# Patient Record
Sex: FEMALE | HISPANIC OR LATINO | Employment: FULL TIME | URBAN - METROPOLITAN AREA
[De-identification: names, ages, dates, MRNs, and addresses within clinical notes are randomized per-mention and may not be internally consistent; named-entity substitution may affect disease eponyms.]

---

## 2022-03-31 ENCOUNTER — APPOINTMENT (OUTPATIENT)
Dept: RADIOLOGY | Facility: MEDICAL CENTER | Age: 22
End: 2022-03-31
Payer: COMMERCIAL

## 2022-03-31 ENCOUNTER — HOSPITAL ENCOUNTER (EMERGENCY)
Facility: MEDICAL CENTER | Age: 22
End: 2022-03-31
Attending: EMERGENCY MEDICINE
Payer: COMMERCIAL

## 2022-03-31 VITALS
DIASTOLIC BLOOD PRESSURE: 74 MMHG | OXYGEN SATURATION: 100 % | WEIGHT: 125 LBS | HEART RATE: 90 BPM | HEIGHT: 63 IN | TEMPERATURE: 98 F | SYSTOLIC BLOOD PRESSURE: 122 MMHG | RESPIRATION RATE: 16 BRPM | BODY MASS INDEX: 22.15 KG/M2

## 2022-03-31 DIAGNOSIS — M54.50 ACUTE MIDLINE LOW BACK PAIN WITHOUT SCIATICA: ICD-10-CM

## 2022-03-31 DIAGNOSIS — M54.6 ACUTE MIDLINE THORACIC BACK PAIN: ICD-10-CM
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
## 2022-03-31 PROCEDURE — 72100 X-RAY EXAM L-S SPINE 2/3 VWS: CPT

## 2022-03-31 PROCEDURE — 72070 X-RAY EXAM THORAC SPINE 2VWS: CPT

## 2022-03-31 PROCEDURE — 99284 EMERGENCY DEPT VISIT MOD MDM: CPT

## 2022-03-31 PROCEDURE — 71045 X-RAY EXAM CHEST 1 VIEW: CPT

## 2022-03-31 PROCEDURE — 700102 HCHG RX REV CODE 250 W/ 637 OVERRIDE(OP)

## 2022-03-31 PROCEDURE — A9270 NON-COVERED ITEM OR SERVICE: HCPCS

## 2022-03-31 PROCEDURE — 307740 HCHG GREEN TRAUMA TEAM SERVICES

## 2022-03-31 RX ORDER — CYCLOBENZAPRINE HCL 5 MG
5-10 TABLET ORAL 3 TIMES DAILY PRN
Qty: 30 TABLET | Refills: 0 | Status: SHIPPED | OUTPATIENT
Start: 2022-03-31

## 2022-03-31 RX ORDER — CYCLOBENZAPRINE HCL 5 MG
5-10 TABLET ORAL 3 TIMES DAILY PRN
Qty: 30 TABLET | Refills: 0 | Status: SHIPPED | OUTPATIENT
Start: 2022-03-31 | End: 2022-03-31 | Stop reason: SDUPTHER

## 2022-03-31 RX ORDER — IBUPROFEN 600 MG/1
600 TABLET ORAL ONCE
Status: COMPLETED | OUTPATIENT
Start: 2022-03-31 | End: 2022-03-31

## 2022-03-31 RX ORDER — ACETAMINOPHEN 325 MG/1
650 TABLET ORAL ONCE
Status: COMPLETED | OUTPATIENT
Start: 2022-03-31 | End: 2022-03-31

## 2022-03-31 RX ORDER — CYCLOBENZAPRINE HCL 10 MG
10 TABLET ORAL ONCE
Status: COMPLETED | OUTPATIENT
Start: 2022-03-31 | End: 2022-03-31

## 2022-03-31 RX ADMIN — IBUPROFEN 600 MG: 600 TABLET ORAL at 20:07

## 2022-03-31 RX ADMIN — ACETAMINOPHEN 650 MG: 325 TABLET, FILM COATED ORAL at 20:06

## 2022-03-31 RX ADMIN — CYCLOBENZAPRINE 10 MG: 10 TABLET, FILM COATED ORAL at 20:07
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
## 2022-04-01 NOTE — ED NOTES
Discharge instructions completed with pt and boyfriend at bedside, prescription switched to print out per pt request. One prescription covered and follow-up care discussed. Work note provided and pt ambulatory to lobby without assistance
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
## 2022-06-21 ENCOUNTER — HOSPITAL ENCOUNTER (EMERGENCY)
Facility: MEDICAL CENTER | Age: 22
End: 2022-06-21
Attending: EMERGENCY MEDICINE
Payer: COMMERCIAL

## 2022-06-21 VITALS
BODY MASS INDEX: 21.88 KG/M2 | DIASTOLIC BLOOD PRESSURE: 59 MMHG | OXYGEN SATURATION: 100 % | RESPIRATION RATE: 18 BRPM | HEART RATE: 73 BPM | TEMPERATURE: 97 F | WEIGHT: 123.46 LBS | HEIGHT: 63 IN | SYSTOLIC BLOOD PRESSURE: 106 MMHG

## 2022-06-21 DIAGNOSIS — R10.12 LUQ PAIN: ICD-10-CM

## 2022-06-21 DIAGNOSIS — R53.83 OTHER FATIGUE: ICD-10-CM

## 2022-06-21 LAB
ALBUMIN SERPL BCP-MCNC: 4.9 G/DL (ref 3.2–4.9)
ALBUMIN/GLOB SERPL: 1.9 G/DL
ALP SERPL-CCNC: 76 U/L (ref 30–99)
ALT SERPL-CCNC: 11 U/L (ref 2–50)
ANION GAP SERPL CALC-SCNC: 13 MMOL/L (ref 7–16)
APPEARANCE UR: CLEAR
AST SERPL-CCNC: 19 U/L (ref 12–45)
BASOPHILS # BLD AUTO: 0.5 % (ref 0–1.8)
BASOPHILS # BLD: 0.03 K/UL (ref 0–0.12)
BILIRUB SERPL-MCNC: 0.7 MG/DL (ref 0.1–1.5)
BILIRUB UR QL STRIP.AUTO: NEGATIVE
BUN SERPL-MCNC: 10 MG/DL (ref 8–22)
CALCIUM SERPL-MCNC: 9.1 MG/DL (ref 8.5–10.5)
CHLORIDE SERPL-SCNC: 103 MMOL/L (ref 96–112)
CO2 SERPL-SCNC: 22 MMOL/L (ref 20–33)
COLOR UR: YELLOW
CREAT SERPL-MCNC: 0.64 MG/DL (ref 0.5–1.4)
EOSINOPHIL # BLD AUTO: 0.1 K/UL (ref 0–0.51)
EOSINOPHIL NFR BLD: 1.6 % (ref 0–6.9)
ERYTHROCYTE [DISTWIDTH] IN BLOOD BY AUTOMATED COUNT: 40.4 FL (ref 35.9–50)
GFR SERPLBLD CREATININE-BSD FMLA CKD-EPI: 129 ML/MIN/1.73 M 2
GLOBULIN SER CALC-MCNC: 2.6 G/DL (ref 1.9–3.5)
GLUCOSE SERPL-MCNC: 86 MG/DL (ref 65–99)
GLUCOSE UR STRIP.AUTO-MCNC: NEGATIVE MG/DL
HCG SERPL QL: NEGATIVE
HCT VFR BLD AUTO: 40.5 % (ref 37–47)
HGB BLD-MCNC: 14 G/DL (ref 12–16)
IMM GRANULOCYTES # BLD AUTO: 0.01 K/UL (ref 0–0.11)
IMM GRANULOCYTES NFR BLD AUTO: 0.2 % (ref 0–0.9)
KETONES UR STRIP.AUTO-MCNC: NEGATIVE MG/DL
LEUKOCYTE ESTERASE UR QL STRIP.AUTO: NEGATIVE
LIPASE SERPL-CCNC: 23 U/L (ref 11–82)
LYMPHOCYTES # BLD AUTO: 2.75 K/UL (ref 1–4.8)
LYMPHOCYTES NFR BLD: 43.2 % (ref 22–41)
MCH RBC QN AUTO: 31.9 PG (ref 27–33)
MCHC RBC AUTO-ENTMCNC: 34.6 G/DL (ref 33.6–35)
MCV RBC AUTO: 92.3 FL (ref 81.4–97.8)
MICRO URNS: NORMAL
MONOCYTES # BLD AUTO: 0.45 K/UL (ref 0–0.85)
MONOCYTES NFR BLD AUTO: 7.1 % (ref 0–13.4)
NEUTROPHILS # BLD AUTO: 3.02 K/UL (ref 2–7.15)
NEUTROPHILS NFR BLD: 47.4 % (ref 44–72)
NITRITE UR QL STRIP.AUTO: NEGATIVE
NRBC # BLD AUTO: 0 K/UL
NRBC BLD-RTO: 0 /100 WBC
PH UR STRIP.AUTO: 5.5 [PH] (ref 5–8)
PLATELET # BLD AUTO: 235 K/UL (ref 164–446)
PMV BLD AUTO: 9.3 FL (ref 9–12.9)
POTASSIUM SERPL-SCNC: 4 MMOL/L (ref 3.6–5.5)
PROT SERPL-MCNC: 7.5 G/DL (ref 6–8.2)
PROT UR QL STRIP: NEGATIVE MG/DL
RBC # BLD AUTO: 4.39 M/UL (ref 4.2–5.4)
RBC UR QL AUTO: NEGATIVE
SODIUM SERPL-SCNC: 138 MMOL/L (ref 135–145)
SP GR UR STRIP.AUTO: 1.01
UROBILINOGEN UR STRIP.AUTO-MCNC: 1 MG/DL
WBC # BLD AUTO: 6.4 K/UL (ref 4.8–10.8)

## 2022-06-21 PROCEDURE — 84703 CHORIONIC GONADOTROPIN ASSAY: CPT

## 2022-06-21 PROCEDURE — 36415 COLL VENOUS BLD VENIPUNCTURE: CPT

## 2022-06-21 PROCEDURE — 700102 HCHG RX REV CODE 250 W/ 637 OVERRIDE(OP): Performed by: EMERGENCY MEDICINE

## 2022-06-21 PROCEDURE — A9270 NON-COVERED ITEM OR SERVICE: HCPCS | Performed by: EMERGENCY MEDICINE

## 2022-06-21 PROCEDURE — 81003 URINALYSIS AUTO W/O SCOPE: CPT

## 2022-06-21 PROCEDURE — 80053 COMPREHEN METABOLIC PANEL: CPT

## 2022-06-21 PROCEDURE — 83690 ASSAY OF LIPASE: CPT

## 2022-06-21 PROCEDURE — 85025 COMPLETE CBC W/AUTO DIFF WBC: CPT

## 2022-06-21 PROCEDURE — 99283 EMERGENCY DEPT VISIT LOW MDM: CPT

## 2022-06-21 RX ORDER — OMEPRAZOLE 20 MG/1
20 CAPSULE, DELAYED RELEASE ORAL DAILY
Qty: 30 CAPSULE | Refills: 0 | Status: SHIPPED | OUTPATIENT
Start: 2022-06-21

## 2022-06-21 RX ADMIN — LIDOCAINE HYDROCHLORIDE 30 ML: 20 SOLUTION OROPHARYNGEAL at 19:52

## 2022-06-22 NOTE — ED TRIAGE NOTES
"Pt ambulatory to triage c/o feeling lightheaded all weekend and states hx of anemia so thought \"that's probably what it is\" pt states takes iron supplements but today began to have luq pain radiating to left flank. Denies fever denies painful urination. Last normal BM today. Nad. vss  "

## 2022-06-22 NOTE — ED NOTES
Patient to room from lobby with steady gait. Changed into gown, connected to monitor. Agree with triage note. Charted for ERP. Call light within reach.

## 2022-06-22 NOTE — ED PROVIDER NOTES
"ED Provider Note    Scribed for Graciela Cordoba M.D. by Meenakshi Harris. 6/21/2022  6:47 PM    Means of arrival: Walk in  History obtained from: patient  History limited by: none      CHIEF COMPLAINT  Chief Complaint   Patient presents with   • Flank Pain   • Lightheadedness       HPI  Libby Ortiz is a 21 y.o. otherwise healthy female who presents to the Emergency Department for fatigue onset 4 days ago. She has associated left upper quadrant abdominal pain and mild headache. Also some associated pain the left flank.  Denies dysuria, cough, congestion, nausea, vomiting, or fevers. Denies history of similar symptoms. She has a history of anemia and was concerned for this today. Endorses saying hydrated. Denies chanance of pregnancy.    REVIEW OF SYSTEMS  Pertinent positive include fatigue, left flank pain, and headache. Pertinent negative include dysuria, cough, congestion, nausea, vomiting, or fevers. All other systems reviewed and are negative.      PAST MEDICAL HISTORY   History of anemia    SOCIAL HISTORY  Social History     Tobacco Use   • Smoking status: Never Smoker   • Smokeless tobacco: Current User   Vaping Use   • Vaping Use: Every day   Substance and Sexual Activity   • Alcohol use: Not Currently   • Drug use: Yes     Types: Inhaled     Comment: marijuana       SURGICAL HISTORY  patient denies any surgical history    CURRENT MEDICATIONS  Current Outpatient Medications   Medication Instructions   • cyclobenzaprine (FLEXERIL) 5-10 mg, Oral, 3 TIMES DAILY PRN       ALLERGIES  No Known Allergies    PHYSICAL EXAM   VITAL SIGNS: /59   Pulse 66   Temp 36.1 °C (96.9 °F) (Temporal)   Resp 15   Ht 1.6 m (5' 3\")   Wt 56 kg (123 lb 7.3 oz)   SpO2 100%   BMI 21.87 kg/m²    Constitutional: Nontoxic appearing, alert in no apparent distress.  HENT: Normocephalic, Atraumatic. Bilateral external ears normal. Nose normal.  Moist mucous membranes.  Oropharynx clear.  Eyes: Pupils are equal and " reactive. Conjunctiva normal.   Neck: Supple, full range of motion  Heart: Regular rate and rhythm.  No murmurs.    Lungs: No respiratory distress, normal work of breathing. Lungs clear to auscultation bilaterally.  Abdomen Soft, no distention. Mild left upper quadrant tenderness to palpation.  No CVA TTP.  Musculoskeletal: Atraumatic. No obvious deformities noted.  No lower extremity edema.  Skin: Warm, Dry.  No erythema, No rash.   Neurologic: Alert and oriented x3. Moving all extremities spontaneously without focal deficits.  Psychiatric: Affect normal, Mood normal, Appears appropriate and not intoxicated.      DIAGNOSTIC STUDIES  LABS  Personally reviewed by me  Labs Reviewed   CBC WITH DIFFERENTIAL - Abnormal; Notable for the following components:       Result Value    Lymphocytes 43.20 (*)     All other components within normal limits   COMP METABOLIC PANEL   LIPASE   HCG QUAL SERUM   URINALYSIS,CULTURE IF INDICATED    Narrative:     Indication for culture:->Patient WITHOUT an indwelling Méndez  catheter in place with new onset of Dysuria, Frequency,  Urgency, and/or Suprapubic pain   ESTIMATED GFR        ED COURSE  Vitals:    06/21/22 1741 06/21/22 1836 06/21/22 1838 06/21/22 1900   BP:  106/59     Pulse:   66 73   Resp:   15 18   Temp:    36.1 °C (97 °F)   TempSrc:    Temporal   SpO2:   100% 100%   Weight: 56 kg (123 lb 7.3 oz)      Height:             Medications administered:  Medications   hyoscyamine-lidocaine-Maalox (GI Cocktail) oral susp cup 30 mL (30 mL Oral Given 6/21/22 1952)       6:47 PM Patient seen and examined at bedside. The patient presents with fatigue and left flank pain. Labs ordered in triage including CBC w/ diff, CMP, lipase, HCG qual, and UA. I informed her this could be related to a viral illness or possible related to her stomach.       MEDICAL DECISION MAKING  Young healthy patient presents with 4 day history of fatigue and vague upper abdominal pain.  She is well appearing with  normal vitals.  Abdominal exam is benign without concern for appendicitis, cholecystitis, pyelonephritis.   Labs are normal without leukocytosis, anemia, transaminitis, elevated lipase concerning for pancreatitis.  Not pregnant.  UA negative for infection or blood to suggest kidney stone.  Possible viral illness or gastritis.  Will discharge home with PPI, diet recommendations and outpatient followup for further workup of fatigue.  Patient understands plan of care and strict return precautions for changing or worsening symptoms.      The patient will return for new or worsening symptoms and is stable at the time of discharge.    DISPOSITION:  Patient will be discharged home in stable condition.    FOLLOW UP:  Sara Chow M.D.  [address] [facility] [place] [address]  [phone]    Schedule an appointment as soon as possible for a visit       [facility], Emergency Dept  [address] [facility] [id number] [phone]    If symptoms worsen      OUTPATIENT MEDICATIONS:  Discharge Medication List as of 6/21/2022  7:53 PM      START taking these medications    Details   omeprazole (PRILOSEC) 20 MG delayed-release capsule Take 1 Capsule by mouth every day., Disp-30 Capsule, R-0, Normal             IMPRESSION  (R10.12) LUQ pain  (R53.83) Other fatigue    Results, diagnoses, and treatment options were discussed with the patient and/or family. Patient verbalized understanding of plan of care.           Meenakshi FOX ([place]), am scribing for, and in the presence of, Graciela Cordoba M.D..    Electronically signed by: Meenakshi Harris ([place]), 6/21/2022    Graciela FOX M.D. personally performed the services described in this documentation, as scribed by Meenakshi Harris in my presence, and it is both accurate and complete.    The note accurately reflects work and decisions made by me.  Graciela Cordoba M.D.  6/22/2022  10:45 AM

## 2022-06-22 NOTE — DISCHARGE INSTRUCTIONS
You were seen in the Emergency Department for abdominal pain and fatigue.    Labs, urine test were completed without significant acute abnormalities.    Please use 1,000mg of tylenol  every 6 hours as needed for pain.  Take medications for possible reflux or gastritis, avoid alcohol, nsaids such as ibuprofen, spicy foods.    Please follow up with your primary care physician.    Return to the Emergency Department with worsening pain, fevers, vomiting, or other concerns.

## 2022-06-22 NOTE — ED NOTES
PT walked back to PUR 72 no assistance needed. She was changed into a gown and put on the monitor.

## 2022-06-22 NOTE — ED NOTES
Discharge teaching and paperwork provided regarding LUQ pain & fatigue and all questions/concerns answered. VSS,  assessment stable. Given information regarding home care and reasons to follow up with ED or primary MD. Patient provided with prilosec Rx.

## 2023-04-17 ENCOUNTER — OFFICE VISIT (OUTPATIENT)
Dept: URGENT CARE | Facility: PHYSICIAN GROUP | Age: 23
End: 2023-04-17
Payer: COMMERCIAL

## 2023-04-17 VITALS
RESPIRATION RATE: 16 BRPM | SYSTOLIC BLOOD PRESSURE: 100 MMHG | BODY MASS INDEX: 21.48 KG/M2 | WEIGHT: 121.25 LBS | TEMPERATURE: 98.5 F | OXYGEN SATURATION: 100 % | HEIGHT: 63 IN | DIASTOLIC BLOOD PRESSURE: 60 MMHG | HEART RATE: 84 BPM

## 2023-04-17 DIAGNOSIS — R10.9 ABDOMINAL PAIN, UNSPECIFIED ABDOMINAL LOCATION: ICD-10-CM

## 2023-04-17 LAB
APPEARANCE UR: CLEAR
BILIRUB UR STRIP-MCNC: NEGATIVE MG/DL
COLOR UR AUTO: YELLOW
GLUCOSE UR STRIP.AUTO-MCNC: NEGATIVE MG/DL
KETONES UR STRIP.AUTO-MCNC: NEGATIVE MG/DL
LEUKOCYTE ESTERASE UR QL STRIP.AUTO: NEGATIVE
NITRITE UR QL STRIP.AUTO: NEGATIVE
PH UR STRIP.AUTO: 6.5 [PH] (ref 5–8)
POCT INT CON NEG: NEGATIVE
POCT INT CON POS: POSITIVE
POCT URINE PREGNANCY TEST: NEGATIVE
PROT UR QL STRIP: NORMAL MG/DL
RBC UR QL AUTO: NEGATIVE
SP GR UR STRIP.AUTO: 1.02
UROBILINOGEN UR STRIP-MCNC: 1 MG/DL

## 2023-04-17 PROCEDURE — 81002 URINALYSIS NONAUTO W/O SCOPE: CPT | Performed by: PHYSICIAN ASSISTANT

## 2023-04-17 PROCEDURE — 99204 OFFICE O/P NEW MOD 45 MIN: CPT | Performed by: PHYSICIAN ASSISTANT

## 2023-04-17 PROCEDURE — 81025 URINE PREGNANCY TEST: CPT | Performed by: PHYSICIAN ASSISTANT

## 2023-04-17 ASSESSMENT — ENCOUNTER SYMPTOMS
BACK PAIN: 1
FEVER: 0
ABDOMINAL PAIN: 1
VOMITING: 0
BRUISES/BLEEDS EASILY: 0
FLANK PAIN: 0
BLOOD IN STOOL: 0
DIARRHEA: 0
DIZZINESS: 0
CONSTIPATION: 0
HEADACHES: 0
NAUSEA: 1
SHORTNESS OF BREATH: 0

## 2023-04-17 ASSESSMENT — FIBROSIS 4 INDEX: FIB4 SCORE: 0.51

## 2023-04-18 NOTE — PROGRESS NOTES
"Subjective     Libby Ortiz is a 22 y.o. female who presents with Abdominal Pain (Upper gastric pain started this morning radiates to back .)    HPI:  Libby Ortiz is a 22 y.o. female who presents today for evaluation of abdominal pain.  Patient reports that this morning, after she woke up, she was doing her daily stretching activities.  At one point she lifted her arms high above her head and she says she felt as though something \"snapped\" in her upper abdomen.  She said that she had immediate severe pain in her upper abdomen.  She has been taking acetaminophen all day and notes that it mildly dulls the pain but does not take it away fully.  She that she did have a bowel movement earlier today which was normal.  She has felt nauseous when the pain builds up has not had any vomiting.  No fever.  Says that she occasionally drinks alcohol.  Did not drink excess alcohol this past weekend because a friend was in town but she normally does not drink at all.      Review of Systems   Constitutional:  Negative for fever.   Respiratory:  Negative for shortness of breath.    Cardiovascular:  Negative for chest pain.   Gastrointestinal:  Positive for abdominal pain and nausea. Negative for blood in stool, constipation, diarrhea, melena and vomiting.   Genitourinary:  Negative for dysuria, flank pain, frequency, hematuria and urgency.   Musculoskeletal:  Positive for back pain.   Neurological:  Negative for dizziness and headaches.   Endo/Heme/Allergies:  Does not bruise/bleed easily.           PMH:  has no past medical history on file.  MEDS:   Current Outpatient Medications:     omeprazole (PRILOSEC) 20 MG delayed-release capsule, Take 1 Capsule by mouth every day. (Patient not taking: Reported on 4/17/2023), Disp: 30 Capsule, Rfl: 0    cyclobenzaprine (FLEXERIL) 5 mg tablet, Take 1-2 Tablets by mouth 3 times a day as needed. (Patient not taking: Reported on 4/17/2023), Disp: 30 Tablet, Rfl: " "0  ALLERGIES: No Known Allergies  SURGHX: History reviewed. No pertinent surgical history.  SOCHX:  reports that she has never smoked. She uses smokeless tobacco. She reports that she does not currently use alcohol. She reports current drug use. Drug: Inhaled.  FH: Family history was reviewed, no pertinent findings to report        Objective     /60 (BP Location: Right arm, Patient Position: Sitting, BP Cuff Size: Adult)   Pulse 84   Temp 36.9 °C (98.5 °F) (Temporal)   Resp 16   Ht 1.6 m (5' 3\")   Wt 55 kg (121 lb 4.1 oz)   SpO2 100%   BMI 21.48 kg/m²      Physical Exam  Constitutional:       Appearance: Normal appearance. She is well-developed.   HENT:      Head: Normocephalic and atraumatic.      Right Ear: External ear normal.      Left Ear: External ear normal.   Eyes:      Conjunctiva/sclera: Conjunctivae normal.      Pupils: Pupils are equal, round, and reactive to light.   Cardiovascular:      Rate and Rhythm: Normal rate and regular rhythm.      Heart sounds: No murmur heard.  Pulmonary:      Effort: Pulmonary effort is normal.      Breath sounds: Normal breath sounds.   Abdominal:      General: Abdomen is flat. Bowel sounds are normal.      Palpations: Abdomen is soft.      Tenderness: There is abdominal tenderness in the epigastric area, periumbilical area, left upper quadrant and left lower quadrant.      Comments: Diffuse upper and left-sided abdominal tenderness with mild guarding noted with palpation of the left abdomen.   Skin:     General: Skin is warm and dry.      Capillary Refill: Capillary refill takes less than 2 seconds.   Neurological:      Mental Status: She is alert and oriented to person, place, and time.   Psychiatric:         Behavior: Behavior normal.         Judgment: Judgment normal.         POCT Pregnancy - Negative      POCT Urinalysis  Lab Results   Component Value Date/Time    POCCOLOR Yellow 04/17/2023 06:39 PM    POCAPPEAR Clear 04/17/2023 06:39 PM    POCLEUKEST " Negative 04/17/2023 06:39 PM    POCNITRITE Negative 04/17/2023 06:39 PM    POCUROBILIGE 1.0 04/17/2023 06:39 PM    POCPROTEIN Trace 04/17/2023 06:39 PM    POCURPH 6.5 04/17/2023 06:39 PM    POCBLOOD Negative 04/17/2023 06:39 PM    POCSPGRV 1.025 04/17/2023 06:39 PM    POCKETONES Negative 04/17/2023 06:39 PM    POCBILIRUBIN Negative 04/17/2023 06:39 PM    POCGLUCUA Negative 04/17/2023 06:39 PM        Assessment & Plan     1. Abdominal pain, unspecified abdominal location  - POCT Urinalysis  - POCT Pregnancy    Patient complaining of severe abdominal pain starting this morning.  Says that it is not really being touched by OTC acetaminophen.  On exam she is exquisitely tender in the left side of abdomen and epigastric region with some mild guarding noted.  I believe patient needs further work-up that we can provide from the urgent care setting tonight.  She is therefore referred to the emergency department for higher level of care.  She is going via private vehicle.  She thinks she is going to go to [facility].

## 2024-12-04 ENCOUNTER — APPOINTMENT (OUTPATIENT)
Dept: RADIOLOGY | Facility: MEDICAL CENTER | Age: 24
End: 2024-12-04
Attending: EMERGENCY MEDICINE
Payer: COMMERCIAL

## 2024-12-04 ENCOUNTER — HOSPITAL ENCOUNTER (EMERGENCY)
Facility: MEDICAL CENTER | Age: 24
End: 2024-12-04
Attending: EMERGENCY MEDICINE
Payer: COMMERCIAL

## 2024-12-04 VITALS
WEIGHT: 115.96 LBS | SYSTOLIC BLOOD PRESSURE: 118 MMHG | BODY MASS INDEX: 20.55 KG/M2 | HEIGHT: 63 IN | DIASTOLIC BLOOD PRESSURE: 71 MMHG | TEMPERATURE: 98.2 F | OXYGEN SATURATION: 98 % | HEART RATE: 75 BPM | RESPIRATION RATE: 18 BRPM

## 2024-12-04 DIAGNOSIS — R10.9 ABDOMINAL PAIN, UNSPECIFIED ABDOMINAL LOCATION: ICD-10-CM

## 2024-12-04 LAB
ALBUMIN SERPL BCP-MCNC: 4.5 G/DL (ref 3.2–4.9)
ALBUMIN/GLOB SERPL: 1.6 G/DL
ALP SERPL-CCNC: 66 U/L (ref 30–99)
ALT SERPL-CCNC: 9 U/L (ref 2–50)
ANION GAP SERPL CALC-SCNC: 12 MMOL/L (ref 7–16)
APPEARANCE UR: CLEAR
AST SERPL-CCNC: 20 U/L (ref 12–45)
BACTERIA #/AREA URNS HPF: ABNORMAL /HPF
BASOPHILS # BLD AUTO: 0.4 % (ref 0–1.8)
BASOPHILS # BLD: 0.03 K/UL (ref 0–0.12)
BILIRUB SERPL-MCNC: 0.3 MG/DL (ref 0.1–1.5)
BILIRUB UR QL STRIP.AUTO: NEGATIVE
BUN SERPL-MCNC: 15 MG/DL (ref 8–22)
CALCIUM ALBUM COR SERPL-MCNC: 9.1 MG/DL (ref 8.5–10.5)
CALCIUM SERPL-MCNC: 9.5 MG/DL (ref 8.5–10.5)
CASTS URNS QL MICRO: ABNORMAL /LPF (ref 0–2)
CHLORIDE SERPL-SCNC: 106 MMOL/L (ref 96–112)
CO2 SERPL-SCNC: 23 MMOL/L (ref 20–33)
COLOR UR: YELLOW
CREAT SERPL-MCNC: 0.63 MG/DL (ref 0.5–1.4)
EOSINOPHIL # BLD AUTO: 0.1 K/UL (ref 0–0.51)
EOSINOPHIL NFR BLD: 1.5 % (ref 0–6.9)
EPITHELIAL CELLS 1715: ABNORMAL /HPF (ref 0–5)
ERYTHROCYTE [DISTWIDTH] IN BLOOD BY AUTOMATED COUNT: 41.1 FL (ref 35.9–50)
GFR SERPLBLD CREATININE-BSD FMLA CKD-EPI: 127 ML/MIN/1.73 M 2
GLOBULIN SER CALC-MCNC: 2.9 G/DL (ref 1.9–3.5)
GLUCOSE SERPL-MCNC: 100 MG/DL (ref 65–99)
GLUCOSE UR STRIP.AUTO-MCNC: NEGATIVE MG/DL
HCG SERPL QL: NEGATIVE
HCT VFR BLD AUTO: 40.9 % (ref 37–47)
HGB BLD-MCNC: 14 G/DL (ref 12–16)
IMM GRANULOCYTES # BLD AUTO: 0.02 K/UL (ref 0–0.11)
IMM GRANULOCYTES NFR BLD AUTO: 0.3 % (ref 0–0.9)
KETONES UR STRIP.AUTO-MCNC: ABNORMAL MG/DL
LEUKOCYTE ESTERASE UR QL STRIP.AUTO: NEGATIVE
LIPASE SERPL-CCNC: 27 U/L (ref 11–82)
LYMPHOCYTES # BLD AUTO: 2.3 K/UL (ref 1–4.8)
LYMPHOCYTES NFR BLD: 33.4 % (ref 22–41)
MCH RBC QN AUTO: 31.8 PG (ref 27–33)
MCHC RBC AUTO-ENTMCNC: 34.2 G/DL (ref 32.2–35.5)
MCV RBC AUTO: 93 FL (ref 81.4–97.8)
MICRO URNS: ABNORMAL
MONOCYTES # BLD AUTO: 0.38 K/UL (ref 0–0.85)
MONOCYTES NFR BLD AUTO: 5.5 % (ref 0–13.4)
NEUTROPHILS # BLD AUTO: 4.05 K/UL (ref 1.82–7.42)
NEUTROPHILS NFR BLD: 58.9 % (ref 44–72)
NITRITE UR QL STRIP.AUTO: NEGATIVE
NRBC # BLD AUTO: 0 K/UL
NRBC BLD-RTO: 0 /100 WBC (ref 0–0.2)
PH UR STRIP.AUTO: 5.5 [PH] (ref 5–8)
PLATELET # BLD AUTO: 280 K/UL (ref 164–446)
PMV BLD AUTO: 9.4 FL (ref 9–12.9)
POTASSIUM SERPL-SCNC: 3.8 MMOL/L (ref 3.6–5.5)
PROT SERPL-MCNC: 7.4 G/DL (ref 6–8.2)
PROT UR QL STRIP: 30 MG/DL
RBC # BLD AUTO: 4.4 M/UL (ref 4.2–5.4)
RBC # URNS HPF: ABNORMAL /HPF (ref 0–2)
RBC UR QL AUTO: NEGATIVE
SODIUM SERPL-SCNC: 141 MMOL/L (ref 135–145)
SP GR UR STRIP.AUTO: 1.03
UROBILINOGEN UR STRIP.AUTO-MCNC: 1 EU/DL
WBC # BLD AUTO: 6.9 K/UL (ref 4.8–10.8)
WBC #/AREA URNS HPF: ABNORMAL /HPF

## 2024-12-04 PROCEDURE — 96374 THER/PROPH/DIAG INJ IV PUSH: CPT

## 2024-12-04 PROCEDURE — 80053 COMPREHEN METABOLIC PANEL: CPT

## 2024-12-04 PROCEDURE — 36415 COLL VENOUS BLD VENIPUNCTURE: CPT

## 2024-12-04 PROCEDURE — 700111 HCHG RX REV CODE 636 W/ 250 OVERRIDE (IP): Mod: JZ | Performed by: EMERGENCY MEDICINE

## 2024-12-04 PROCEDURE — 83690 ASSAY OF LIPASE: CPT

## 2024-12-04 PROCEDURE — 96375 TX/PRO/DX INJ NEW DRUG ADDON: CPT

## 2024-12-04 PROCEDURE — 700117 HCHG RX CONTRAST REV CODE 255: Performed by: EMERGENCY MEDICINE

## 2024-12-04 PROCEDURE — 85025 COMPLETE CBC W/AUTO DIFF WBC: CPT

## 2024-12-04 PROCEDURE — 74177 CT ABD & PELVIS W/CONTRAST: CPT

## 2024-12-04 PROCEDURE — 84703 CHORIONIC GONADOTROPIN ASSAY: CPT

## 2024-12-04 PROCEDURE — 81001 URINALYSIS AUTO W/SCOPE: CPT

## 2024-12-04 PROCEDURE — 99284 EMERGENCY DEPT VISIT MOD MDM: CPT

## 2024-12-04 RX ORDER — ONDANSETRON 2 MG/ML
4 INJECTION INTRAMUSCULAR; INTRAVENOUS ONCE
Status: COMPLETED | OUTPATIENT
Start: 2024-12-04 | End: 2024-12-04

## 2024-12-04 RX ADMIN — IOHEXOL 100 ML: 350 INJECTION, SOLUTION INTRAVENOUS at 14:15

## 2024-12-04 RX ADMIN — ONDANSETRON 4 MG: 2 INJECTION INTRAMUSCULAR; INTRAVENOUS at 13:40

## 2024-12-04 RX ADMIN — FENTANYL CITRATE 50 MCG: 50 INJECTION, SOLUTION INTRAMUSCULAR; INTRAVENOUS at 13:42

## 2024-12-04 ASSESSMENT — PAIN DESCRIPTION - PAIN TYPE: TYPE: ACUTE PAIN

## 2024-12-04 NOTE — ED PROVIDER NOTES
"ED Provider Note    CHIEF COMPLAINT  Chief Complaint   Patient presents with    Abdominal Pain     Burning umbilical abdominal pain x3 hours that radiates to back. (+) nausea, (-) V/D.            HPI/ROS    OUTSIDE HISTORIAN(S):  Patient's boyfriend is at bedside adding history review of systems.    Libby Ortiz is a 24 y.o. female who presents with abdominal pain.  She [employer] pain proximate 3 hours prior arrival.  Pain is dull in nature, to her bellybutton radiates to her back, no alleviating or exacerbating factors.  She denies dysuria, hematochezia, melena, hematemesis, nausea, vomiting, fever.    PAST MEDICAL HISTORY       SURGICAL HISTORY  patient denies any surgical history    FAMILY HISTORY  History reviewed. No pertinent family history.    SOCIAL HISTORY  Social History     Tobacco Use    Smoking status: Never    Smokeless tobacco: Current   Vaping Use    Vaping status: Every Day   Substance and Sexual Activity    Alcohol use: Not Currently    Drug use: Yes     Types: Inhaled     Comment: marijuana    Sexual activity: Not on file       CURRENT MEDICATIONS  Home Medications       Reviewed by Ivon Victoria R.N. (Registered Nurse) on 12/04/24 at 1229  Med List Status: Partial     Medication Last Dose Status   cyclobenzaprine (FLEXERIL) 5 mg tablet  Active   omeprazole (PRILOSEC) 20 MG delayed-release capsule  Active                  Audit from Redirected Encounters    **Home medications have not yet been reviewed for this encounter**         ALLERGIES  No Known Allergies    PHYSICAL EXAM  VITAL SIGNS: /71   Pulse 75   Temp 36.9 °C (98.5 °F) (Temporal)   Resp 18   Ht 1.6 m (5' 3\")   Wt 52.6 kg (115 lb 15.4 oz)   SpO2 98%   BMI 20.54 kg/m²      Nursing notes and vitals reviewed.  Constitutional: Well developed, Well nourished, No acute distress, Non-toxic appearance.   Abdomen: Bowel sounds normal, Soft, moderate periumbilical tenderness, slight guarding, rebound, negative " Chan's sign   skin: Warm, Dry, No erythema, No rash.   Extremities: No deformity, no edema, good range of motion range of motion upper lower extremes bilaterally      EKG/LABS  Results for orders placed or performed during the hospital encounter of 12/04/24   CBC WITH DIFFERENTIAL    Collection Time: 12/04/24 12:43 PM   Result Value Ref Range    WBC 6.9 4.8 - 10.8 K/uL    RBC 4.40 4.20 - 5.40 M/uL    Hemoglobin 14.0 12.0 - 16.0 g/dL    Hematocrit 40.9 37.0 - 47.0 %    MCV 93.0 81.4 - 97.8 fL    MCH 31.8 27.0 - 33.0 pg    MCHC 34.2 32.2 - 35.5 g/dL    RDW 41.1 35.9 - 50.0 fL    Platelet Count 280 164 - 446 K/uL    MPV 9.4 9.0 - 12.9 fL    Neutrophils-Polys 58.90 44.00 - 72.00 %    Lymphocytes 33.40 22.00 - 41.00 %    Monocytes 5.50 0.00 - 13.40 %    Eosinophils 1.50 0.00 - 6.90 %    Basophils 0.40 0.00 - 1.80 %    Immature Granulocytes 0.30 0.00 - 0.90 %    Nucleated RBC 0.00 0.00 - 0.20 /100 WBC    Neutrophils (Absolute) 4.05 1.82 - 7.42 K/uL    Lymphs (Absolute) 2.30 1.00 - 4.80 K/uL    Monos (Absolute) 0.38 0.00 - 0.85 K/uL    Eos (Absolute) 0.10 0.00 - 0.51 K/uL    Baso (Absolute) 0.03 0.00 - 0.12 K/uL    Immature Granulocytes (abs) 0.02 0.00 - 0.11 K/uL    NRBC (Absolute) 0.00 K/uL   COMP METABOLIC PANEL    Collection Time: 12/04/24 12:43 PM   Result Value Ref Range    Sodium 141 135 - 145 mmol/L    Potassium 3.8 3.6 - 5.5 mmol/L    Chloride 106 96 - 112 mmol/L    Co2 23 20 - 33 mmol/L    Anion Gap 12.0 7.0 - 16.0    Glucose 100 (H) 65 - 99 mg/dL    Bun 15 8 - 22 mg/dL    Creatinine 0.63 0.50 - 1.40 mg/dL    Calcium 9.5 8.5 - 10.5 mg/dL    Correct Calcium 9.1 8.5 - 10.5 mg/dL    AST(SGOT) 20 12 - 45 U/L    ALT(SGPT) 9 2 - 50 U/L    Alkaline Phosphatase 66 30 - 99 U/L    Total Bilirubin 0.3 0.1 - 1.5 mg/dL    Albumin 4.5 3.2 - 4.9 g/dL    Total Protein 7.4 6.0 - 8.2 g/dL    Globulin 2.9 1.9 - 3.5 g/dL    A-G Ratio 1.6 g/dL   LIPASE    Collection Time: 12/04/24 12:43 PM   Result Value Ref Range    Lipase 27 11 -  82 U/L   HCG QUAL SERUM    Collection Time: 12/04/24 12:43 PM   Result Value Ref Range    Beta-Hcg Qualitative Serum Negative Negative   ESTIMATED GFR    Collection Time: 12/04/24 12:43 PM   Result Value Ref Range    GFR (CKD-EPI) 127 >60 mL/min/1.73 m 2   URINALYSIS,CULTURE IF INDICATED    Collection Time: 12/04/24  1:12 PM    Specimen: Urine   Result Value Ref Range    Color Yellow     Character Clear     Specific Gravity 1.029 <1.035    Ph 5.5 5.0 - 8.0    Glucose Negative Negative mg/dL    Ketones Trace (A) Negative mg/dL    Protein 30 (A) Negative mg/dL    Bilirubin Negative Negative    Urobilinogen, Urine 1.0 <=1.0 EU/dL    Nitrite Negative Negative    Leukocyte Esterase Negative Negative    Occult Blood Negative Negative    Micro Urine Req Microscopic    URINE MICROSCOPIC (W/UA)    Collection Time: 12/04/24  1:12 PM   Result Value Ref Range    WBC 0-2 /hpf    RBC 0-2 0 - 2 /hpf    Bacteria Few (A) None /hpf    Epithelial Cells 3-5 0 - 5 /hpf    Urine Casts 0-2 0 - 2 /lpf       I have independently interpreted this EKG    RADIOLOGY/PROCEDURES   I have independently interpreted the diagnostic imaging associated with this visit and am waiting the final reading from the radiologist.   My preliminary interpretation is as follows: CT abdomen pelvis positive for constipation, no evidence of acute intra-abdominal abnormality/perforation or free fluid    Radiologist interpretation:  CT-ABDOMEN-PELVIS WITH   Final Result      1. No acute inflammatory process in the abdomen or pelvis.   2. Normal appendix.   3. 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          COURSE & MEDICAL DECISION MAKING    ASSESSMENT, COURSE AND PLAN  Care Narrative: This is a pleasant 24-year-old female presents with abdominal pain.  She had periumbilical pain is concern for possible appendicitis.  For this reason, CT scan was completed was negative for acute appendicitis, diverticulitis, cholecystitis,  colitis.  Patient did have a leukocytosis is afebrile.  I do not believe she has an infectious etiology or surgical condition.  She does have evidence of constipation and discussed with her the need for stool softener.  In addition, her is not pregnant [place] pregnancy, no [place] urinary tract infection, lipase is negative excluding pancreatitis.  On reevaluation the patient prior to discharge at 1454, she had no increasing symptoms, she has had no vomiting, afebrile the patient was discharged with strict return precautions.      DISPOSITION AND DISCUSSIONS      Decision tools and prescription drugs considered including, but not limited to: Although at this point I do not believe the patient has an acute intra-abdominal process that requires emergent surgical intervention there is a possibility that the patient is experiencing an early infection that is in evolution that may require further evaluation and possible surgical consultation. Therefore, strict return precautions have been given to return to the emergency department within 24 hours if the patient has any remaining abdominal pain and to return sooner for increasing pain, uncontrolled nausea or vomiting, fevers or change in symptoms. The patient will followup with their primary care physician within 3 days for re-evaluation.  FINAL DIAGNOSIS  1. Abdominal pain, unspecified abdominal location      DISPOSITION:  Patient will be discharged home in stable condition.    FOLLOW UP:  [facility], Emergency Dept  [address] [facility] [address] [phone]    If symptoms worsen    Sara Chow M.D.  [address] [facility] [address]  [phone]    Schedule an appointment as soon as possible for a visit in 3 days  As needed      Electronically signed by: Dariel Henriquez D.O., 12/4/2024 1:07 PM

## 2024-12-04 NOTE — ED TRIAGE NOTES
Chief Complaint   Patient presents with    Abdominal Pain     Burning umbilical abdominal pain x3 hours that radiates to back. (+) nausea, (-) V/D.

## 2024-12-04 NOTE — ED NOTES
Discharge orders received, IV and monitor discontinued, instructions and education given, follow-up discussed, pt verbalized understanding. Pt ambulated to lobby.

## 2024-12-04 NOTE — DISCHARGE INSTRUCTIONS
Abdominal Pain, Extended Version   Belly Pain, Stomach Pain    Take clear fluid diet 12 hours and slowly advance to solid food as tolerated. Take  Ibuprofen or Tylenol for pain as needed. Return to the emergency department in 24 hours for reevaluation if you continue to have abdominal. Return sooner if you have worsening pain (especially in the lower right abdomen), pain that is worse with movement, uncontrolled vomiting, new fever, bleeding or ill appearance as you may have a surgical condition in evolution that require further evaluation and consultation.    Your exam may not have shown the exact reason you have abdominal pain.  Since there are many different causes of abdominal pain, another checkup and more tests is required in 24 hours if your symptoms do not improve. One of the many possible causes of abdominal pain in any person who has not had their appendix removed is Acute Appendicitis.  Appendicitis is often a very difficult to diagnosis. Normal blood tests, urine tests, and even ultrasound and CT can not ensure there is not an early appendicitis. Sometimes only the changes which occur over time will allow appendicitis and other causes of abdominal pain to be determined.  Because of this, it is important you follow all of the instructions below.                                                                                                Home care instructions  Rest.  Do not eat solid food until your pain is gone.  While You Have Pain:  Stay on a clear liquid diet.  A clear liquid is one you can see through (water, weak tea, broth or bouillon, ginger ale, Jell-o, Jonathan-Aid, Gatorade, apple juice, popsicles or ice chips).   When Your Pain is Gone:  Start a light diet (dry toast, crackers, applesauce, white rice, bananas, broth or bouillon) and increase the diet slowly, as long as it does not bother you.  No dairy products (including cheese and eggs) and no spicy, fatty, fried or high fiber foods.  No  alcohol, caffeine or cigarettes.  Take your regular medicines unless the caregiver told you not to.  Take any prescribed medicine as directed.  Do not take medicine containing aspirin, ibuprofen (Advil® / Motrin® ), naprosyn/naproxen (Aleve®) or ketoprofen (Orudis® ) unless told to by your own caregiver.    seek immediate medical attention if :  Your pain is not gone in 8-12 hours.  Your pain becomes worse, changes location or feels different.  You have a fever or shaking chills.  You keep throwing up or cannot drink liquids.   You see blood when you throw up or see blood in your bowel movements.    Your bowel movements become dark or black.  You move your bowels frequently.  Your bowel movements stop (become blocked) or you cannot pass gas.  You have bloody, frequent or painful urination (“passing water”).  Your skin or the whites of your eyes look yellow.  Your stomach becomes bloated or bigger.  You notice bleeding or discharge from your vagina.  You have dizziness or fainting.  You have chest or back pain.   Anything else worries you.  You become short of breath.    If you have questions or concerns, please call your caregiver.     Adapted from ©2001  [employer] Aftercare Instruction Sheets  Last reviewed July 12, 2005, [employer], creator of [employer] Patient Information System.    [employer]Care® Patient Information ©2007 [employer].

## 2025-04-10 ENCOUNTER — OFFICE VISIT (OUTPATIENT)
Dept: BEHAVIORAL HEALTH | Facility: CLINIC | Age: 25
End: 2025-04-10
Payer: COMMERCIAL

## 2025-04-10 DIAGNOSIS — F33.1 MAJOR DEPRESSIVE DISORDER, RECURRENT EPISODE, MODERATE (HCC): ICD-10-CM

## 2025-04-10 DIAGNOSIS — F41.1 GENERALIZED ANXIETY DISORDER: ICD-10-CM

## 2025-04-10 DIAGNOSIS — F51.01 PRIMARY INSOMNIA: ICD-10-CM

## 2025-04-10 PROCEDURE — 90792 PSYCH DIAG EVAL W/MED SRVCS: CPT | Performed by: PSYCHIATRY & NEUROLOGY

## 2025-04-10 RX ORDER — HYDROXYZINE HYDROCHLORIDE 25 MG/1
25 TABLET, FILM COATED ORAL 3 TIMES DAILY
Qty: 90 TABLET | Refills: 0 | Status: SHIPPED | OUTPATIENT
Start: 2025-04-10 | End: 2025-05-10

## 2025-04-10 RX ORDER — CITALOPRAM HYDROBROMIDE 10 MG/1
10 TABLET ORAL NIGHTLY
Qty: 30 TABLET | Refills: 0 | Status: SHIPPED | OUTPATIENT
Start: 2025-04-10 | End: 2025-05-10
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
## 2025-05-07 ENCOUNTER — OFFICE VISIT (OUTPATIENT)
Dept: BEHAVIORAL HEALTH | Facility: CLINIC | Age: 25
End: 2025-05-07
Payer: COMMERCIAL

## 2025-05-07 DIAGNOSIS — F33.1 MAJOR DEPRESSIVE DISORDER, RECURRENT EPISODE, MODERATE (HCC): ICD-10-CM

## 2025-05-07 DIAGNOSIS — F51.01 PRIMARY INSOMNIA: ICD-10-CM

## 2025-05-07 DIAGNOSIS — F41.1 GENERALIZED ANXIETY DISORDER: ICD-10-CM

## 2025-05-07 PROCEDURE — 99214 OFFICE O/P EST MOD 30 MIN: CPT | Performed by: PSYCHIATRY & NEUROLOGY

## 2025-05-07 RX ORDER — CITALOPRAM HYDROBROMIDE 20 MG/1
20 TABLET ORAL NIGHTLY
Qty: 30 TABLET | Refills: 0 | Status: SHIPPED | OUTPATIENT
Start: 2025-05-07 | End: 2025-06-06
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
## 2025-06-10 ENCOUNTER — OFFICE VISIT (OUTPATIENT)
Dept: BEHAVIORAL HEALTH | Facility: CLINIC | Age: 25
End: 2025-06-10
Payer: COMMERCIAL

## 2025-06-10 DIAGNOSIS — F33.1 MAJOR DEPRESSIVE DISORDER, RECURRENT EPISODE, MODERATE (HCC): Primary | ICD-10-CM

## 2025-06-10 DIAGNOSIS — F41.1 GENERALIZED ANXIETY DISORDER: ICD-10-CM

## 2025-06-10 DIAGNOSIS — F51.01 PRIMARY INSOMNIA: ICD-10-CM

## 2025-06-10 PROCEDURE — 99214 OFFICE O/P EST MOD 30 MIN: CPT | Performed by: PSYCHIATRY & NEUROLOGY

## 2025-06-10 RX ORDER — CITALOPRAM HYDROBROMIDE 20 MG/1
20 TABLET ORAL NIGHTLY
Qty: 90 TABLET | Refills: 0 | Status: SHIPPED | OUTPATIENT
Start: 2025-06-10 | End: 2025-06-25

## 2025-06-10 RX ORDER — HYDROXYZINE HYDROCHLORIDE 50 MG/1
50 TABLET, FILM COATED ORAL 3 TIMES DAILY
Qty: 270 TABLET | Refills: 0 | Status: SHIPPED | OUTPATIENT
Start: 2025-06-10 | End: 2025-09-08

## 2025-06-10 NOTE — PROGRESS NOTES
[facility]   Follow Up Assessment     This provider informed the patient their medical records are totally confidential except for the use by other providers involved in their care, or if the patient signs a release, or to report instances of child or elder abuse, or if it is determined they are an immediate risk to harm themselves or others.    Name: Libby Ortiz  MRN: [id number]  : 2000  Age: 24 y.o.  Date of assessment: 6/10/2025  PCP: Sara Chow M.D.      Subjective:  Chart reviewed prior to seeing her in my office.  She was seen most recently on May 7.  We reviewed her current medication combination, purposes, doses, etc.  Celexa 20 mg at bedtime is helping her depression and she prefers not to make dosage change.  She would like to increase hydroxyzine from 25 mg 3 times daily as needed to 50 mg 3 times daily as needed.   She does have an outside psychotherapist.    Objective:  She is alert, oriented, and cooperative.  Relatedness is good.  Grooming is good.  Speech is normal rate.  Less anxious.  Memory is good.  Insight and judgment are good.  No indication of psychotic thinking.    Current Risk:       Suicidal: Not suicidal       Homicidal: Not homicidal       Self-Harm: No plan to harm self       Relapse: (Low/Moderate/High): Moderate       Crisis Safety Plan Reviewed: Discussed with patient    Diagnosis:   Major depressive disorder, recurrent  Generalized anxiety disorder  PTSD  Insomnia    Treatment Plan:  The current treatment plan consists of quarterly psychiatric sessions designed to evaluate her depression, anxiety, and insomnia.    Duration will be for a minimum of 12 months and will be reviewed at each visit.    Goals: Remission of depression with control of anxiety and improvement in sleep in order to prevent relapse due to the chronic nature of her behavioral health problems and mental illness.  Continue Celexa 20 mg at bedtime.  Increase hydroxyzine to  50 mg 3 times daily as needed    Tj Bowers M.D.      This note was created using voice recognition software (Dragon). The accuracy of the dictation is limited by the abilities of the software. I have reviewed the note prior to signing, however some errors in grammar and context are still possible. If you have any questions related to this note please do not hesitate to contact our office.

## 2025-06-25 RX ORDER — CITALOPRAM HYDROBROMIDE 20 MG/1
20 TABLET ORAL EVERY EVENING
Qty: 30 TABLET | Refills: 0 | Status: SHIPPED | OUTPATIENT
Start: 2025-06-25